# Patient Record
Sex: MALE | URBAN - METROPOLITAN AREA
[De-identification: names, ages, dates, MRNs, and addresses within clinical notes are randomized per-mention and may not be internally consistent; named-entity substitution may affect disease eponyms.]

---

## 2024-07-29 ENCOUNTER — TELEPHONE (OUTPATIENT)
Age: 29
End: 2024-07-29

## 2024-10-09 ENCOUNTER — OFFICE VISIT (OUTPATIENT)
Age: 29
End: 2024-10-09
Payer: COMMERCIAL

## 2024-10-09 VITALS
BODY MASS INDEX: 32.76 KG/M2 | HEIGHT: 69 IN | TEMPERATURE: 98.6 F | WEIGHT: 221.2 LBS | OXYGEN SATURATION: 96 % | SYSTOLIC BLOOD PRESSURE: 109 MMHG | DIASTOLIC BLOOD PRESSURE: 69 MMHG | RESPIRATION RATE: 14 BRPM | HEART RATE: 83 BPM

## 2024-10-09 DIAGNOSIS — Z13.220 SCREENING FOR HYPERLIPIDEMIA: ICD-10-CM

## 2024-10-09 DIAGNOSIS — Z13.1 SCREENING FOR DIABETES MELLITUS: ICD-10-CM

## 2024-10-09 DIAGNOSIS — Z11.59 ENCOUNTER FOR HEPATITIS C SCREENING TEST FOR LOW RISK PATIENT: ICD-10-CM

## 2024-10-09 DIAGNOSIS — R00.2 PALPITATIONS: ICD-10-CM

## 2024-10-09 DIAGNOSIS — F43.23 ADJUSTMENT DISORDER WITH MIXED ANXIETY AND DEPRESSED MOOD: ICD-10-CM

## 2024-10-09 DIAGNOSIS — Z00.00 ROUTINE GENERAL MEDICAL EXAMINATION AT A HEALTH CARE FACILITY: Primary | ICD-10-CM

## 2024-10-09 DIAGNOSIS — Z11.4 SCREENING FOR HIV (HUMAN IMMUNODEFICIENCY VIRUS): ICD-10-CM

## 2024-10-09 PROCEDURE — 93000 ELECTROCARDIOGRAM COMPLETE: CPT | Performed by: NURSE PRACTITIONER

## 2024-10-09 PROCEDURE — 99385 PREV VISIT NEW AGE 18-39: CPT | Performed by: NURSE PRACTITIONER

## 2024-10-09 PROCEDURE — 99203 OFFICE O/P NEW LOW 30 MIN: CPT | Performed by: NURSE PRACTITIONER

## 2024-10-09 RX ORDER — CITALOPRAM HYDROBROMIDE 20 MG/1
20 TABLET ORAL DAILY
Qty: 90 TABLET | Refills: 3 | Status: SHIPPED | OUTPATIENT
Start: 2024-10-09

## 2024-10-09 RX ORDER — CITALOPRAM HYDROBROMIDE 20 MG/1
20 TABLET ORAL DAILY
COMMUNITY
Start: 2024-07-29 | End: 2024-10-09 | Stop reason: SDUPTHER

## 2024-10-09 SDOH — ECONOMIC STABILITY: FOOD INSECURITY: WITHIN THE PAST 12 MONTHS, THE FOOD YOU BOUGHT JUST DIDN'T LAST AND YOU DIDN'T HAVE MONEY TO GET MORE.: NEVER TRUE

## 2024-10-09 SDOH — ECONOMIC STABILITY: FOOD INSECURITY: WITHIN THE PAST 12 MONTHS, YOU WORRIED THAT YOUR FOOD WOULD RUN OUT BEFORE YOU GOT MONEY TO BUY MORE.: NEVER TRUE

## 2024-10-09 SDOH — ECONOMIC STABILITY: INCOME INSECURITY: HOW HARD IS IT FOR YOU TO PAY FOR THE VERY BASICS LIKE FOOD, HOUSING, MEDICAL CARE, AND HEATING?: NOT HARD AT ALL

## 2024-10-09 ASSESSMENT — PATIENT HEALTH QUESTIONNAIRE - PHQ9
SUM OF ALL RESPONSES TO PHQ9 QUESTIONS 1 & 2: 0
2. FEELING DOWN, DEPRESSED OR HOPELESS: NOT AT ALL
SUM OF ALL RESPONSES TO PHQ QUESTIONS 1-9: 0

## 2024-10-09 ASSESSMENT — ENCOUNTER SYMPTOMS
TROUBLE SWALLOWING: 0
ABDOMINAL PAIN: 0
COUGH: 0
DIARRHEA: 0
SHORTNESS OF BREATH: 0
CONSTIPATION: 0

## 2024-10-09 NOTE — PROGRESS NOTES
History of present illness:Caden Brownlee is a 29 y.o. male presenting for an annual exam and to establish care.      - Primarily desk work, works at nonprofit.  - No routine exercise.  - General diet. 2-3 cups of coffee.  - Dad with early heart disease in his 40s/50s.  - 1-2 beers or glass of wine each evening.  - No tobacco use.    #Palpitations  - States that he gets occasional palpitations. Sometimes he will feel them, sometimes he tells me that his spouse has noticed them.  - Doesn't have any associated symptoms. Does state that when he gets the sensation, it can lasts several minutes.    #Anxiety  - Symptoms well-controlled with the use of citalopram and has been stable on his medication.  - Would like refill at his current dose.    Review of Systems   Constitutional:  Negative for activity change and appetite change.   HENT:  Negative for congestion and trouble swallowing.    Eyes:  Negative for visual disturbance.   Respiratory:  Negative for cough and shortness of breath.    Cardiovascular:  Positive for palpitations. Negative for chest pain and leg swelling.   Gastrointestinal:  Negative for abdominal pain, constipation and diarrhea.   Musculoskeletal:  Positive for arthralgias (Occasional knee and low back pain).   Neurological:  Negative for weakness and light-headedness.   Psychiatric/Behavioral:  Negative for dysphoric mood.    All other systems reviewed and are negative.          Past Medical History:   Diagnosis Date    Anxiety     Depression      Past Surgical History:   Procedure Laterality Date    TONSILLECTOMY      WISDOM TOOTH EXTRACTION       Family History   Problem Relation Age of Onset    Breast Cancer Mother     Heart Disease Father     Heart Surgery Father     Heart Attack Paternal Grandfather     Diabetes Maternal Uncle        Prior to Admission medications    Medication Sig Start Date End Date Taking? Authorizing Provider   citalopram (CELEXA) 20 MG tablet Take 1 tablet by mouth daily

## 2024-10-09 NOTE — PROGRESS NOTES
Chief Complaint   Patient presents with    New Patient     Previous PCP: Moved around a bit - most recently [facility] in [place], [place]    Annual Exam     \"Have you been to the ER, urgent care clinic since your last visit?  Hospitalized since your last visit?\"    NO    “Have you seen or consulted any other health care providers outside of [facility] since your last visit?”    [place], [place]                  10/9/2024     9:11 AM   PHQ-9    Little interest or pleasure in doing things 0   Feeling down, depressed, or hopeless 0   PHQ-2 Score 0   PHQ-9 Total Score 0           Financial Resource Strain: Low Risk  (10/9/2024)    Overall Financial Resource Strain (CARDIA)     Difficulty of Paying Living Expenses: Not hard at all      Food Insecurity: No Food Insecurity (10/9/2024)    Hunger Vital Sign     Worried About Running Out of Food in the Last Year: Never true     Ran Out of Food in the Last Year: Never true          Health Maintenance Due   Topic Date Due    Depression Screen  Never done    Varicella vaccine (1 of 2 - 13+ 2-dose series) Never done    HIV screen  Never done    Hepatitis C screen  Never done    Hepatitis B vaccine (1 of 3 - 19+ 3-dose series) Never done    DTaP/Tdap/Td vaccine (1 - Tdap) Never done    Flu vaccine (1) 08/01/2024    COVID-19 Vaccine (2 - 2023-24 season) 09/01/2024       Anesthesia Volume In Cc: 0 Medical Necessity Information: It is in your best interest to select a reason for this procedure from the list below. All of these items fulfill various CMS LCD requirements except the new and changing color options. Detail Level: Detailed Add Associated Diagnoses If Applicable When Selecting Medical Necessity: Yes Include Z78.9 (Other Specified Conditions Influencing Health Status) As An Associated Diagnosis?: No Medical Necessity Clause: This procedure was medically necessary because the lesions that were treated were: Consent: Written consent obtained and the risks of skin tag removal was reviewed with the patient including but not limited to bleeding, pigmentary change, infection, pain, and remote possibility of scarring.

## 2024-10-10 LAB
ALBUMIN SERPL-MCNC: 4.7 G/DL (ref 4.1–5.1)
ALP SERPL-CCNC: 90 IU/L (ref 44–121)
ALT SERPL-CCNC: 18 IU/L (ref 0–44)
AST SERPL-CCNC: 24 IU/L (ref 0–40)
BILIRUB SERPL-MCNC: 0.3 MG/DL (ref 0–1.2)
BUN SERPL-MCNC: 18 MG/DL (ref 6–20)
BUN/CREAT SERPL: 16 (ref 9–20)
CALCIUM SERPL-MCNC: 9.6 MG/DL (ref 8.7–10.2)
CHLORIDE SERPL-SCNC: 103 MMOL/L (ref 96–106)
CHOLEST SERPL-MCNC: 215 MG/DL (ref 100–199)
CO2 SERPL-SCNC: 21 MMOL/L (ref 20–29)
CREAT SERPL-MCNC: 1.15 MG/DL (ref 0.76–1.27)
EGFRCR SERPLBLD CKD-EPI 2021: 87 ML/MIN/1.73
ERYTHROCYTE [DISTWIDTH] IN BLOOD BY AUTOMATED COUNT: 12.4 % (ref 11.6–15.4)
GLOBULIN SER CALC-MCNC: 2.4 G/DL (ref 1.5–4.5)
GLUCOSE SERPL-MCNC: 88 MG/DL (ref 70–99)
HBA1C MFR BLD: 5.2 % (ref 4.8–5.6)
HCT VFR BLD AUTO: 47.9 % (ref 37.5–51)
HCV IGG SERPL QL IA: NON REACTIVE
HDLC SERPL-MCNC: 39 MG/DL
HGB BLD-MCNC: 15.1 G/DL (ref 13–17.7)
HIV 1+2 AB+HIV1 P24 AG SERPL QL IA: NON REACTIVE
IMP & REVIEW OF LAB RESULTS: NORMAL
LDLC SERPL CALC-MCNC: 145 MG/DL (ref 0–99)
MCH RBC QN AUTO: 28.4 PG (ref 26.6–33)
MCHC RBC AUTO-ENTMCNC: 31.5 G/DL (ref 31.5–35.7)
MCV RBC AUTO: 90 FL (ref 79–97)
PLATELET # BLD AUTO: 273 X10E3/UL (ref 150–450)
POTASSIUM SERPL-SCNC: 4.5 MMOL/L (ref 3.5–5.2)
PROT SERPL-MCNC: 7.1 G/DL (ref 6–8.5)
RBC # BLD AUTO: 5.32 X10E6/UL (ref 4.14–5.8)
SODIUM SERPL-SCNC: 140 MMOL/L (ref 134–144)
TRIGL SERPL-MCNC: 168 MG/DL (ref 0–149)
VLDLC SERPL CALC-MCNC: 31 MG/DL (ref 5–40)
WBC # BLD AUTO: 6.3 X10E3/UL (ref 3.4–10.8)

## 2024-10-24 ENCOUNTER — TELEPHONE (OUTPATIENT)
Age: 29
End: 2024-10-24

## 2024-10-24 NOTE — TELEPHONE ENCOUNTER
Called patient. Two patient identifiers verified. Discussed lab results per provider's note. Verbalized understanding.    Jeannie Patiño [facility]

## 2024-10-24 NOTE — TELEPHONE ENCOUNTER
----- Message from CALUDIO Soto CNP sent at 10/15/2024  7:16 PM EDT -----  Please inform of results:    - Cholesterol is a bit elevated. At this point in time, I would focus on eating healthy which includes avoiding trans fats, saturated fats, and added sugars. Healthy fats are good, this includes nuts, avocados, fatty fish (I.e. salmon). Exercise has a great impact on cholesterol levels as well.      - Electrolytes, kidney function, and liver tests are normal.    - A1c, which is your blood sugar averages over 3 months, is normal.    - HIV and Hepatitis C screens are negative.    - Blood count is normal.    Thanks

## 2024-12-18 ENCOUNTER — OFFICE VISIT (OUTPATIENT)
Age: 29
End: 2024-12-18
Payer: COMMERCIAL

## 2024-12-18 DIAGNOSIS — F43.23 ADJUSTMENT REACTION WITH ANXIETY AND DEPRESSION: Primary | ICD-10-CM

## 2024-12-18 PROCEDURE — 90791 PSYCH DIAGNOSTIC EVALUATION: CPT | Performed by: SOCIAL WORKER

## 2024-12-18 NOTE — PSYCHOTHERAPY
In Office-Initial Evaluation    Time Start:9:00 am  Time End:10:02 am    DX:    Diagnosis Orders   1. Adjustment reaction with anxiety and depression                 Met with Mr. Brownlee 12/18/2024 for our first appointment.  This patient comes in today reporting symptoms of anxiety and depression.  He met with his NP, MANDI Wise, and scheduled this appt.  The patient reports he has had some form of anxiety most of his life and he experienced deep depression his sophomore year of high school with his first romantic breakup.  The patient has been in counseling on two occasions.  The first time was when he was in elementary school and stated he had an irrational fear of thunderstorms.  That counseling experience seemed to be okay, however, when he went to see someone in high school, the therapist tried to put him in a psychiatric inpatient facility.  This meeting today was the first time, as an adult, that he has initiated treatment again.  Mr. Brownlee reports that he sometimes has periods where he feels reclusive and isolative.  He isn't sure what gets him in these moods but he would like to figure that out.  He does not report any suicidal or homicidal thoughts, however, he reports he is wanting some better coping skills than going from 1-5-----5 being rather hopeless and wandering if there isn't more in his life.  Mr. Brownlee got  in September to a woman he has been with for the past 4 years.  He is very happy in his marriage and in his move to [facility] 7 months ago.  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  He has an Masters Degree in sports leadership.  He met his wife on line and they started dating long distance due to the pandemic.  She is from [place].  Mr. Brownlee grew up in [place] with his biological parents and he has one older 1/2 brother from his mother's first marriage.  His mother is 64 and worked for the [employer] and his father is 68 and was in the

## 2025-01-10 ENCOUNTER — TELEMEDICINE (OUTPATIENT)
Age: 30
End: 2025-01-10
Payer: COMMERCIAL

## 2025-01-10 DIAGNOSIS — F43.23 ADJUSTMENT REACTION WITH ANXIETY AND DEPRESSION: Primary | ICD-10-CM

## 2025-01-10 PROCEDURE — 90834 PSYTX W PT 45 MINUTES: CPT | Performed by: SOCIAL WORKER

## 2025-01-10 NOTE — PSYCHOTHERAPY
Virtual Visit (At Home) -Follow Up    DX:    Diagnosis Orders   1. Adjustment reaction with anxiety and depression             Met with Mr. Brownlee today for our follow up appt. minimal progress-this patient reports he and his wife had a quiet holiday this year, staying at home with his parents coming for a few days after Derrek.  He states that he doesn't feel the depression as much as he feels anxiety.  He has more days of feeling better since our last appt but is still over thinking most days.  Mr. Brownlee states that he has internal conflicts re: wasting time, not feeling productive, but also fighting to actually finding the motivation to \"get up and do something-for example--exercising.\"  He still reports feeling like each work day is like the one before but we discussed some ways to change things up so there is more \"excitement\" in his daily life.  The patient is trying to eat healthier as he has put weight on and his wife is supportive of this decision.  No acute symptoms observed or reported.    We did set a follow-up appointment with this clinician.      Follow-up appt date (if applicable) is:  feb. 7 @ 9 VV.    --Carol Wick LCSW on 1/10/2025 at 10:20 AM    An electronic signature was used to authenticate this note.

## 2025-01-10 NOTE — PROGRESS NOTES
Follow Up Virtual Visit Billing    Session Time     Start Time:    9:00 am  Finish Time:  9:52 am    Total time spent for this encounter:  52 minutes    We did set a follow-up appointment with this clinician.      Return in about 4 weeks (around 2/7/2025).     Therapy Modalities   Building Self Esteem, Building Insight, Cognitive Behavioral, Client Empowerment, Homework / Exercises, and Stress Management    Assessment / Plan     Psychotherapy Target Symptoms:  anxiety and lack of motivation for change, assistance with structure in personal goals    Assessment:   minimal progress -this patient reports he and his wife had a quiet holiday this year, staying at home with his parents coming for a few days after Derrek.  He states that he doesn't feel the depression as much as he feels anxiety.  He has more days of feeling better since our last appt but is still over thinking most days.  Mr. Brownlee states that he has internal conflicts re: wasting time, not feeling productive, but also fighting to actually finding the motivation to \"get up and do something-for example--exercising.\"  He still reports feeling like each work day is like the one before but we discussed some ways to change things up so there is more \"excitement\" in his daily life.  The patient is trying to eat healthier as he has put weight on and his wife is supportive of this decision.  No acute symptoms observed or reported.    Plan:  continue psychotherapy    Dx:  Adjustment disorder with anxiety and depression.     Caden Brownlee, was evaluated through a synchronous (real-time) audio-video encounter. The patient (or guardian if applicable) is aware that this is a billable service, which includes applicable co-pays. This Virtual Visit was conducted with patient's (and/or legal guardian's) consent. Patient identification was verified, and a caregiver was present when appropriate.   The patient was located at Home: [address] [place] [address]

## 2025-02-13 ENCOUNTER — TELEMEDICINE (OUTPATIENT)
Age: 30
End: 2025-02-13
Payer: COMMERCIAL

## 2025-02-13 DIAGNOSIS — F43.23 ADJUSTMENT REACTION WITH ANXIETY AND DEPRESSION: Primary | ICD-10-CM

## 2025-02-13 PROCEDURE — 90837 PSYTX W PT 60 MINUTES: CPT | Performed by: SOCIAL WORKER

## 2025-02-13 NOTE — PROGRESS NOTES
Follow Up Virtual Visit Billing    Session Time     Start Time:    9:00 am  Finish Time:  9:57 am    Total time spent for this encounter:  57 minutes    We did set a follow-up appointment with this clinician.      Return in about 3 weeks (around 3/6/2025).     Therapy Modalities   Building Insight, Cognitive , Homework / Exercises, and Stress Management    Assessment / Plan     Psychotherapy Target Symptoms:  anxiety, depressed mood, and rumination, expressing feelings, identifying rational versus irrational thinking.    Assessment:  Improving:   This patient reports he has begun creating some structure in his day but using an karson that has a to do list.  He has also incorporated walking 30 minutes as a time to de-stress and take time for himself.  Today we talked a lot about his upbringing and his relationship with his father.  Further, how that relationship/communication has been carried in to his adult life.  Today this therapist suggested he keep a \"journal\" of his daily feelings (1-5) as well as identifying why he feels that way.  He suggested that he complete this task both in the am and again in the pm.  He will discuss these numbers in our next appt.  No acute symptoms reported or observed.    Plan:  continue psychotherapy    1. Adjustment reaction with anxiety and depression       Caden Brownlee, was evaluated through a synchronous (real-time) audio-video encounter. The patient (or guardian if applicable) is aware that this is a billable service, which includes applicable co-pays. This Virtual Visit was conducted with patient's (and/or legal guardian's) consent. Patient identification was verified, and a caregiver was present when appropriate.   The patient was located at Home: [address] [place] [id number]  Provider was located at Home (Appt Dept State): [place]  Confirm you are appropriately licensed, registered, or certified to deliver care in the state where the patient is located as indicated

## 2025-02-13 NOTE — PSYCHOTHERAPY
Virtual Visit (At Home) -Follow Up    DX:    Diagnosis Orders   1. Adjustment reaction with anxiety and depression             Met with Mr. Brownlee today for our follow up appt. Improving:   This patient reports he has begun creating some structure in his day but using an karson that has a to do list.  He has also incorporated walking 30 minutes as a time to de-stress and take time for himself.  Today we talked a lot about his upbringing and his relationship with his father.  Further, how that relationship/communication has been carried in to his adult life.  Today this therapist suggested he keep a \"journal\" of his daily feelings (1-5) as well as identifying why he feels that way.  He suggested that he complete this task both in the am and again in the pm.  He will discuss these numbers in our next appt.  No acute symptoms reported or observed.    We did set a follow-up appointment with this clinician.      Follow-up appt date (if applicable) is:  March 6 @ 9:00 am  VV.    --Carol Wick LCSW on 2/13/2025 at 10:05 AM    An electronic signature was used to authenticate this note.

## 2025-03-06 ENCOUNTER — TELEMEDICINE (OUTPATIENT)
Age: 30
End: 2025-03-06
Payer: COMMERCIAL

## 2025-03-06 DIAGNOSIS — F43.23 ADJUSTMENT REACTION WITH ANXIETY AND DEPRESSION: Primary | ICD-10-CM

## 2025-03-06 PROCEDURE — 90837 PSYTX W PT 60 MINUTES: CPT | Performed by: SOCIAL WORKER

## 2025-03-06 NOTE — PSYCHOTHERAPY
Virtual Visit (At Home) -Follow Up    DX:    Diagnosis Orders   1. Adjustment reaction with anxiety and depression             Met with Mr. Brownlee today for our follow up appt. Improving:   This patient has improved in that he is more aware of how his actions correlate with his feelings.  He admits that, for the most part, he is at baseline and there are very few times he expresses extreme happiness or even happiness on a regular basis.  He goes back to his childhood where he always needed to \"try harder and succeed more\" and thus he does not look at his accomplishments as successes, but rather as the expectation.  He talks about his supportive wife and how much he appreciates her prompting him to be more communicative.  He stated that he had a very hard time working on his homework by journaling his feelings with the \"why's\" for the feeling.  He will work on this assignment for our next appt.  No acute symptoms were observed or reported.    We did set a follow-up appointment with this clinician.      Follow-up appt date (if applicable) is:  March 25 @ 9:00 VV    --Carol Wick LCSW on 3/6/2025 at 1:53 PM    An electronic signature was used to authenticate this note.

## 2025-03-06 NOTE — PROGRESS NOTES
Follow Up Virtual Visit Billing    Session Time     Start Time:    9:04 am  Finish Time:  9:58 am    Total time spent for this encounter:  54 minutes    We did set a follow-up appointment with this clinician.      Return in about 3 weeks (around 3/27/2025).     Therapy Modalities   Building Self Esteem, Building Insight, Cognitive , Mindfulness, and Stress Management    Assessment / Plan     Psychotherapy Target Symptoms:  anxiety and low self confidence, identifying feelings and their correlation with behavior/actions.    Assessment:  Improving:   This patient has improved in that he is more aware of how his actions correlate with his feelings.  He admits that, for the most part, he is at baseline and there are very few times he expresses extreme happiness or even happiness on a regular basis.  He goes back to his childhood where he always needed to \"try harder and succeed more\" and thus he does not look at his accomplishments as successes, but rather as the expectation.  He talks about his supportive wife and how much he appreciates her prompting him to be more communicative.  He stated that he had a very hard time working on his homework by journaling his feelings with the \"why's\" for the feeling.  He will work on this assignment for our next appt.  No acute symptoms were observed or reported.    Plan:  continue psychotherapy    1. Adjustment reaction with anxiety and depression       Caden Brownlee, was evaluated through a synchronous (real-time) audio-video encounter. The patient (or guardian if applicable) is aware that this is a billable service, which includes applicable co-pays. This Virtual Visit was conducted with patient's (and/or legal guardian's) consent. Patient identification was verified, and a caregiver was present when appropriate.   The patient was located at Home: [address] [place] [id number]  Provider was located at Home (Appt Dept State): [place]  Confirm you are appropriately

## 2025-03-25 ENCOUNTER — TELEMEDICINE (OUTPATIENT)
Age: 30
End: 2025-03-25
Payer: COMMERCIAL

## 2025-03-25 DIAGNOSIS — F43.23 ADJUSTMENT REACTION WITH ANXIETY AND DEPRESSION: Primary | ICD-10-CM

## 2025-03-25 PROCEDURE — 90834 PSYTX W PT 45 MINUTES: CPT | Performed by: SOCIAL WORKER

## 2025-03-25 NOTE — PROGRESS NOTES
Follow Up Virtual Visit Billing    Session Time     Start Time:    9:00 am  Finish Time:  9:50 am    Total time spent for this encounter:  50 minutes    We did set a follow-up appointment with this clinician.      Return in about 2 weeks (around 4/8/2025).     Therapy Modalities   Building Insight, Cognitive , Client Empowerment, and Stress Management    Assessment / Plan     Psychotherapy Target Symptoms:  anxiety and depressed mood    Assessment:  Improving:   This patient reports he has become more aware of his moods/actions and how they effect others.  He has been able to correlate his overall mood with the things that occurred during the day.  Overall, Mr. Brownlee reports he is an average \"3\" most days and every once in a while he is feeling more elevated to a \"4,\" out of 5.  He does state that he has a lot of events planned for the next several months and is happy that he and his wife will have things outside of their routine going on.  Mr Brownlee stated that when he feels the happiest is when he is not thinking about work, his day is planned and there are no \"surprises\" and he can relax.  The patient and his wife, although got  last year, have scheduled their honeymoon in a few months and are planning to go to [place].  He seems excited by this trip.  He is not reporting any acute symptoms and does seem less anxious than in previous appts.  We will follow up in 2 weeks.    Plan:  continue psychotherapy    1. Adjustment reaction with anxiety and depression       Caden Brownlee, was evaluated through a synchronous (real-time) audio-video encounter. The patient (or guardian if applicable) is aware that this is a billable service, which includes applicable co-pays. This Virtual Visit was conducted with patient's (and/or legal guardian's) consent. Patient identification was verified, and a caregiver was present when appropriate.   The patient was located at Home: [address] [place] [address]

## 2025-03-25 NOTE — PSYCHOTHERAPY
Virtual Visit (At Home) -Follow Up    DX:    Diagnosis Orders   1. Adjustment reaction with anxiety and depression             Met with Mr. Brownlee today for our follow up appt. Improving:   This patient reports he has become more aware of his moods/actions and how they effect others.  He has been able to correlate his overall mood with the things that occurred during the day.  Overall, Mr. Brownlee reports he is an average \"3\" most days and every once in a while he is feeling more elevated to a \"4,\" out of 5.  He does state that he has a lot of events planned for the next several months and is happy that he and his wife will have things outside of their routine going on.  Mr Brownlee stated that when he feels the happiest is when he is not thinking about work, his day is planned and there are no \"surprises\" and he can relax.  The patient and his wife, although got  last year, have scheduled their honeymoon in a few months and are planning to go to [place].  He seems excited by this trip.  He is not reporting any acute symptoms and does seem less anxious than in previous appts.  We will follow up in 2 weeks.    We did set a follow-up appointment with this clinician.      Follow-up appt date (if applicable) is:  April 8 @ 9:00 VV    --Carol Wick LCSW on 3/25/2025 at 4:18 PM    An electronic signature was used to authenticate this note.